# Patient Record
Sex: FEMALE | Race: WHITE
[De-identification: names, ages, dates, MRNs, and addresses within clinical notes are randomized per-mention and may not be internally consistent; named-entity substitution may affect disease eponyms.]

---

## 2018-08-24 ENCOUNTER — HOSPITAL ENCOUNTER (OUTPATIENT)
Dept: HOSPITAL 47 - RADUSWWP | Age: 18
Discharge: HOME | End: 2018-08-24
Attending: OBSTETRICS & GYNECOLOGY
Payer: COMMERCIAL

## 2018-08-24 DIAGNOSIS — N83.01: ICD-10-CM

## 2018-08-24 DIAGNOSIS — N83.02: Primary | ICD-10-CM

## 2018-08-24 PROCEDURE — 76856 US EXAM PELVIC COMPLETE: CPT

## 2018-08-24 NOTE — US
EXAMINATION TYPE: US pelvic complete

 

DATE OF EXAM: 8/24/2018

 

COMPARISON: CT

 

CLINICAL HISTORY: R10.2 Pelvic pain; hypogastric region is area of pain x 2 years; patient stated on 
oral contraceptives to regulate menstrual period and just changed brands end of July.

 

TECHNIQUE:  Transabdominal (TA).  Transabdominal sonographic images of the pelvis were acquired.  

 

Date of LMP:  07/15/2018

 

EXAM MEASUREMENTS:

 

Uterus:  5.6 x 5.0 x 2.7 cm

Endometrial Stripe: 0.6 cm

Right Ovary:  3.1 x 2.7 x 1.2 cm

Left Ovary:  3.5 x 2.6 x 2.0cm

 

 

 

1. Uterus:  Anteverted  

2. Endometrium:  unable to correlate thickness with LMP as patient stated started new oral contracept
william in July

3. Right Ovary:  small follicles

4. Left Ovary:  small follicles

**Spectral, color and waveform Doppler imaging shows good arterial and venous flow within the ovaries
; there is no evidence for ovarian torsion.

5. Bilateral Adnexa:  wnl

6. Posterior cul-de-sac:  wnl

 

 

 

IMPRESSION: 

1. Small ovarian follicles.

## 2021-02-24 ENCOUNTER — HOSPITAL ENCOUNTER (OUTPATIENT)
Dept: HOSPITAL 47 - RADUSWWP | Age: 21
Discharge: HOME | End: 2021-02-24
Attending: OBSTETRICS & GYNECOLOGY
Payer: COMMERCIAL

## 2021-02-24 DIAGNOSIS — N91.5: Primary | ICD-10-CM

## 2021-02-24 PROCEDURE — 76830 TRANSVAGINAL US NON-OB: CPT

## 2021-02-24 NOTE — US
EXAMINATION TYPE: US transvaginal

 

DATE OF EXAM: 2/24/2021

 

COMPARISON: 8/24/2080

 

CLINICAL HISTORY: 21-year-old female N91.5 Oligomenorrhea. Pt states LMP in July

 

TECHNIQUE:  Transvaginal (TV).  Transvaginal sonographic images of the pelvis were acquired.  

 

Date of LMP:  July

 

FINDINGS:

 

EXAM MEASUREMENTS:

 

Uterus:  6.3 x 2.8 x 3.7 cm

Endometrial Stripe: 0.6 cm

Right Ovary:  3.7 x 3.1 x 3.3 cm for volume of 19.8 mL

Left Ovary:  2.9 x 3.1 x 2.2 cm for volume of 10.6 mL.

 

 

 

1. Uterus:  Anteverted  and otherwise wnl

2. Endometrium:  wnl

3. Right Ovary:  Polycystic in appearance. Echogenic area with posterior shadowing= 0.8 x 0.7 x 0.9 c
m could represent a small nonspecific calcification follow-up can be considered.

4. Left Ovary:  There is follicular change noted.

5. Bilateral Adnexa:  wnl

6. Posterior cul-de-sac:  wnl

 

 

 

IMPRESSION: 

1. Possible polycystic right ovary with a volume of 19.8 mL.

2. In addition, there is a 9 mm echogenic and shadowing area within the right ovary that could repres
ent a nonspecific calcification. Six-month follow-up to reassess.

## 2021-09-08 ENCOUNTER — HOSPITAL ENCOUNTER (OUTPATIENT)
Dept: HOSPITAL 47 - RADUSWWP | Age: 21
Discharge: HOME | End: 2021-09-08
Attending: OBSTETRICS & GYNECOLOGY
Payer: COMMERCIAL

## 2021-09-08 DIAGNOSIS — N83.201: Primary | ICD-10-CM

## 2021-09-08 PROCEDURE — 76856 US EXAM PELVIC COMPLETE: CPT

## 2021-09-08 NOTE — US
EXAMINATION TYPE: US pelvic complete

 

DATE OF EXAM: 9/8/2021

 

COMPARISON: NONE

 

CLINICAL HISTORY: Abnormal findings R93.89. follow up exam of right ovary for calcification, always h
as pelvic pain

 

TECHNIQUE:  TA.  Transabdominal sonographic images of the pelvis were acquired. 

 

Date of LMP:  8/15/2021

 

EXAM MEASUREMENTS:

 

Uterus:  6.7  x 4.4 x 3.3 cm

Endometrial Stripe: 0.6 cm

Right Ovary:  3.4 x 2.4 x 2.2 cm

Left Ovary:  2.5 x 2.1 x 1.9 cm

 

 

 

1. Uterus:  Anteverted   wnl

2. Endometrium:  wnl

3. Right Ovary:  1.0 x 0.8 x 0.6cm, previous size = 0.9 x 0.7 x 0.8cm

4. Left Ovary:  wnl

5. Bilateral Adnexa:  wnl

6. Posterior cul-de-sac:  wnl

 

 

 

IMPRESSION:

Persistent right ovarian cyst with minimal increase in size. Continued follow-up advised.